# Patient Record
(demographics unavailable — no encounter records)

---

## 2024-10-09 NOTE — HISTORY OF PRESENT ILLNESS
[de-identified] : The patient returns for her scheduled breast surveillance examination and she notes no new symptoms or changes in either breast.  Her prior right lateral breast pain has since resolved.  She is now 2 years status post excision of the right breast intraductal papilloma and radial scar.

## 2024-10-09 NOTE — PHYSICAL EXAM
[de-identified] : Obese but otherwise healthy [de-identified] : No adenopathy [de-identified] : Symmetrical and large, no nipple discharge, nipple retraction, suspicious skin change noted bilaterally.  No discrete mass, suspicious area, or focal tenderness are noted in either breast.  No axillary adenopathy bilaterally.

## 2024-10-09 NOTE — ASSESSMENT
[FreeTextEntry1] : Benign breast surveillance examination.  Patient is now 2 years postop, and will therefore return here annually or sooner as needed.  Next bilateral mammogram is due in June and an appropriate requisition was provided.  All of her questions were answered.

## 2025-07-17 NOTE — PLAN
[FreeTextEntry1] : Routine gyn with fibroids, heavy menses and hx of galactorrhea pap/hpv fsh/estradiol/prolactin/tsh mammo  pelvic u/s rv for results